# Patient Record
Sex: FEMALE | Race: WHITE | NOT HISPANIC OR LATINO | Employment: FULL TIME | URBAN - METROPOLITAN AREA
[De-identification: names, ages, dates, MRNs, and addresses within clinical notes are randomized per-mention and may not be internally consistent; named-entity substitution may affect disease eponyms.]

---

## 2024-03-12 ENCOUNTER — APPOINTMENT (OUTPATIENT)
Dept: NUCLEAR MEDICINE | Facility: HOSPITAL | Age: 64
End: 2024-03-12
Payer: COMMERCIAL

## 2024-03-12 ENCOUNTER — HOSPITAL ENCOUNTER (OUTPATIENT)
Facility: HOSPITAL | Age: 64
Discharge: HOME | End: 2024-03-12
Attending: EMERGENCY MEDICINE | Admitting: EMERGENCY MEDICINE
Payer: COMMERCIAL

## 2024-03-12 ENCOUNTER — APPOINTMENT (OUTPATIENT)
Dept: CT IMAGING | Facility: HOSPITAL | Age: 64
End: 2024-03-12
Payer: COMMERCIAL

## 2024-03-12 ENCOUNTER — APPOINTMENT (OUTPATIENT)
Dept: GENERAL RADIOLOGY | Facility: HOSPITAL | Age: 64
End: 2024-03-12
Payer: COMMERCIAL

## 2024-03-12 ENCOUNTER — APPOINTMENT (OUTPATIENT)
Dept: CARDIOLOGY | Facility: HOSPITAL | Age: 64
End: 2024-03-12
Payer: COMMERCIAL

## 2024-03-12 VITALS
WEIGHT: 231.48 LBS | HEIGHT: 65 IN | DIASTOLIC BLOOD PRESSURE: 65 MMHG | HEART RATE: 60 BPM | SYSTOLIC BLOOD PRESSURE: 123 MMHG | OXYGEN SATURATION: 94 % | BODY MASS INDEX: 38.57 KG/M2 | TEMPERATURE: 97.6 F | RESPIRATION RATE: 16 BRPM

## 2024-03-12 DIAGNOSIS — I10 HYPERTENSION, UNSPECIFIED TYPE: ICD-10-CM

## 2024-03-12 DIAGNOSIS — R42 DIZZY: ICD-10-CM

## 2024-03-12 DIAGNOSIS — R07.9 CHEST PAIN, UNSPECIFIED TYPE: Primary | ICD-10-CM

## 2024-03-12 LAB
ALBUMIN SERPL-MCNC: 4.2 G/DL (ref 3.5–5.2)
ALBUMIN/GLOB SERPL: 2.2 G/DL
ALP SERPL-CCNC: 55 U/L (ref 39–117)
ALT SERPL W P-5'-P-CCNC: 21 U/L (ref 1–33)
ANION GAP SERPL CALCULATED.3IONS-SCNC: 11 MMOL/L (ref 5–15)
APTT PPP: 25.8 SECONDS (ref 61–76.5)
AST SERPL-CCNC: 22 U/L (ref 1–32)
BASOPHILS # BLD AUTO: 0.1 10*3/MM3 (ref 0–0.2)
BASOPHILS NFR BLD AUTO: 0.6 % (ref 0–1.5)
BH CV ECHO LEFT VENTRICLE GLOBAL LONGITUDINAL STRAIN: -21.3 %
BH CV ECHO MEAS - ACS: 2 CM
BH CV ECHO MEAS - AO MAX PG: 5.2 MMHG
BH CV ECHO MEAS - AO MEAN PG: 3 MMHG
BH CV ECHO MEAS - AO V2 MAX: 114 CM/SEC
BH CV ECHO MEAS - AO V2 VTI: 25.7 CM
BH CV ECHO MEAS - AVA(I,D): 3.9 CM2
BH CV ECHO MEAS - EDV(CUBED): 85.2 ML
BH CV ECHO MEAS - EDV(MOD-SP4): 82.2 ML
BH CV ECHO MEAS - EF(MOD-SP4): 54.5 %
BH CV ECHO MEAS - ESV(CUBED): 29.8 ML
BH CV ECHO MEAS - ESV(MOD-SP4): 37.4 ML
BH CV ECHO MEAS - FS: 29.5 %
BH CV ECHO MEAS - IVS/LVPW: 1 CM
BH CV ECHO MEAS - IVSD: 1.1 CM
BH CV ECHO MEAS - LA DIMENSION: 3.2 CM
BH CV ECHO MEAS - LAT PEAK E' VEL: 8.6 CM/SEC
BH CV ECHO MEAS - LV DIASTOLIC VOL/BSA (35-75): 38.5 CM2
BH CV ECHO MEAS - LV MASS(C)D: 168.9 GRAMS
BH CV ECHO MEAS - LV MAX PG: 6.6 MMHG
BH CV ECHO MEAS - LV MEAN PG: 3 MMHG
BH CV ECHO MEAS - LV SYSTOLIC VOL/BSA (12-30): 17.5 CM2
BH CV ECHO MEAS - LV V1 MAX: 128 CM/SEC
BH CV ECHO MEAS - LV V1 VTI: 29.1 CM
BH CV ECHO MEAS - LVIDD: 4.4 CM
BH CV ECHO MEAS - LVIDS: 3.1 CM
BH CV ECHO MEAS - LVOT AREA: 3.5 CM2
BH CV ECHO MEAS - LVOT DIAM: 2.1 CM
BH CV ECHO MEAS - LVPWD: 1.1 CM
BH CV ECHO MEAS - MED PEAK E' VEL: 7 CM/SEC
BH CV ECHO MEAS - MV A MAX VEL: 83.8 CM/SEC
BH CV ECHO MEAS - MV DEC SLOPE: 142 CM/SEC2
BH CV ECHO MEAS - MV DEC TIME: 0.33 SEC
BH CV ECHO MEAS - MV E MAX VEL: 52.9 CM/SEC
BH CV ECHO MEAS - MV E/A: 0.63
BH CV ECHO MEAS - MV MAX PG: 3.5 MMHG
BH CV ECHO MEAS - MV MEAN PG: 1 MMHG
BH CV ECHO MEAS - MV P1/2T: 138.2 MSEC
BH CV ECHO MEAS - MV V2 VTI: 28.3 CM
BH CV ECHO MEAS - MVA(P1/2T): 1.59 CM2
BH CV ECHO MEAS - MVA(VTI): 3.6 CM2
BH CV ECHO MEAS - PA ACC TIME: 0.16 SEC
BH CV ECHO MEAS - PA V2 MAX: 92.7 CM/SEC
BH CV ECHO MEAS - RV MAX PG: 1.72 MMHG
BH CV ECHO MEAS - RV V1 MAX: 65.5 CM/SEC
BH CV ECHO MEAS - RV V1 VTI: 17.4 CM
BH CV ECHO MEAS - RVDD: 2.5 CM
BH CV ECHO MEAS - SI(MOD-SP4): 21 ML/M2
BH CV ECHO MEAS - SV(LVOT): 100.8 ML
BH CV ECHO MEAS - SV(MOD-SP4): 44.8 ML
BH CV ECHO MEAS - TAPSE (>1.6): 1.32 CM
BH CV ECHO MEASUREMENTS AVERAGE E/E' RATIO: 6.78
BH CV REST NUCLEAR ISOTOPE DOSE: 11 MCI
BH CV STRESS BP STAGE 1: NORMAL
BH CV STRESS BP STAGE 2: NORMAL
BH CV STRESS COMMENTS STAGE 1: NORMAL
BH CV STRESS COMMENTS STAGE 2: NORMAL
BH CV STRESS DOSE REGADENOSON STAGE 1: 0.4
BH CV STRESS DURATION MIN STAGE 1: 0
BH CV STRESS DURATION MIN STAGE 2: 4
BH CV STRESS DURATION SEC STAGE 1: 10
BH CV STRESS DURATION SEC STAGE 2: 0
BH CV STRESS HR STAGE 1: 58
BH CV STRESS HR STAGE 2: 98
BH CV STRESS NUCLEAR ISOTOPE DOSE: 33 MCI
BH CV STRESS PROTOCOL 1: NORMAL
BH CV STRESS RECOVERY BP: NORMAL MMHG
BH CV STRESS RECOVERY HR: 75 BPM
BH CV STRESS STAGE 1: 1
BH CV STRESS STAGE 2: 2
BH CV XLRA - TDI S': 11.6 CM/SEC
BILIRUB SERPL-MCNC: 0.4 MG/DL (ref 0–1.2)
BUN SERPL-MCNC: 23 MG/DL (ref 8–23)
BUN/CREAT SERPL: 26.1 (ref 7–25)
CALCIUM SPEC-SCNC: 9.1 MG/DL (ref 8.6–10.5)
CHLORIDE SERPL-SCNC: 100 MMOL/L (ref 98–107)
CO2 SERPL-SCNC: 27 MMOL/L (ref 22–29)
CREAT SERPL-MCNC: 0.88 MG/DL (ref 0.57–1)
D DIMER PPP FEU-MCNC: <0.19 MG/L (FEU) (ref 0–0.62)
DEPRECATED RDW RBC AUTO: 46.4 FL (ref 37–54)
EGFRCR SERPLBLD CKD-EPI 2021: 74.4 ML/MIN/1.73
EOSINOPHIL # BLD AUTO: 0.1 10*3/MM3 (ref 0–0.4)
EOSINOPHIL NFR BLD AUTO: 1.4 % (ref 0.3–6.2)
ERYTHROCYTE [DISTWIDTH] IN BLOOD BY AUTOMATED COUNT: 14.8 % (ref 12.3–15.4)
GEN 5 2HR TROPONIN T REFLEX: 9 NG/L
GLOBULIN UR ELPH-MCNC: 1.9 GM/DL
GLUCOSE SERPL-MCNC: 101 MG/DL (ref 65–99)
HCT VFR BLD AUTO: 41.7 % (ref 34–46.6)
HGB BLD-MCNC: 13.9 G/DL (ref 12–15.9)
INR PPP: 0.98 (ref 0.93–1.1)
LEFT ATRIUM VOLUME INDEX: 18.3 ML/M2
LYMPHOCYTES # BLD AUTO: 3.9 10*3/MM3 (ref 0.7–3.1)
LYMPHOCYTES NFR BLD AUTO: 40.4 % (ref 19.6–45.3)
MAXIMAL PREDICTED HEART RATE: 158 BPM
MCH RBC QN AUTO: 29.5 PG (ref 26.6–33)
MCHC RBC AUTO-ENTMCNC: 33.3 G/DL (ref 31.5–35.7)
MCV RBC AUTO: 88.5 FL (ref 79–97)
MONOCYTES # BLD AUTO: 0.8 10*3/MM3 (ref 0.1–0.9)
MONOCYTES NFR BLD AUTO: 8.5 % (ref 5–12)
NEUTROPHILS NFR BLD AUTO: 4.7 10*3/MM3 (ref 1.7–7)
NEUTROPHILS NFR BLD AUTO: 49.1 % (ref 42.7–76)
NRBC BLD AUTO-RTO: 0.1 /100 WBC (ref 0–0.2)
PERCENT MAX PREDICTED HR: 66.46 %
PLATELET # BLD AUTO: 339 10*3/MM3 (ref 140–450)
PMV BLD AUTO: 8.2 FL (ref 6–12)
POTASSIUM SERPL-SCNC: 3.9 MMOL/L (ref 3.5–5.2)
PROT SERPL-MCNC: 6.1 G/DL (ref 6–8.5)
PROTHROMBIN TIME: 10.7 SECONDS (ref 9.6–11.7)
RBC # BLD AUTO: 4.71 10*6/MM3 (ref 3.77–5.28)
SINUS: 3 CM
SODIUM SERPL-SCNC: 138 MMOL/L (ref 136–145)
STJ: 2 CM
STRESS BASELINE BP: NORMAL MMHG
STRESS BASELINE HR: 58 BPM
STRESS PERCENT HR: 78 %
STRESS POST PEAK BP: NORMAL MMHG
STRESS POST PEAK HR: 105 BPM
STRESS TARGET HR: 134 BPM
TROPONIN T DELTA: -1 NG/L
TROPONIN T SERPL HS-MCNC: 10 NG/L
WBC NRBC COR # BLD AUTO: 9.6 10*3/MM3 (ref 3.4–10.8)

## 2024-03-12 PROCEDURE — 84484 ASSAY OF TROPONIN QUANT: CPT | Performed by: EMERGENCY MEDICINE

## 2024-03-12 PROCEDURE — 80053 COMPREHEN METABOLIC PANEL: CPT | Performed by: EMERGENCY MEDICINE

## 2024-03-12 PROCEDURE — 93005 ELECTROCARDIOGRAM TRACING: CPT | Performed by: EMERGENCY MEDICINE

## 2024-03-12 PROCEDURE — 93005 ELECTROCARDIOGRAM TRACING: CPT

## 2024-03-12 PROCEDURE — 93306 TTE W/DOPPLER COMPLETE: CPT

## 2024-03-12 PROCEDURE — A9502 TC99M TETROFOSMIN: HCPCS | Performed by: NURSE PRACTITIONER

## 2024-03-12 PROCEDURE — 25010000002 REGADENOSON 0.4 MG/5ML SOLUTION: Performed by: NURSE PRACTITIONER

## 2024-03-12 PROCEDURE — 85730 THROMBOPLASTIN TIME PARTIAL: CPT | Performed by: EMERGENCY MEDICINE

## 2024-03-12 PROCEDURE — 25010000002 ONDANSETRON PER 1 MG: Performed by: EMERGENCY MEDICINE

## 2024-03-12 PROCEDURE — 71045 X-RAY EXAM CHEST 1 VIEW: CPT

## 2024-03-12 PROCEDURE — G0378 HOSPITAL OBSERVATION PER HR: HCPCS

## 2024-03-12 PROCEDURE — 99285 EMERGENCY DEPT VISIT HI MDM: CPT

## 2024-03-12 PROCEDURE — 0 TECHNETIUM TETROFOSMIN KIT: Performed by: INTERNAL MEDICINE

## 2024-03-12 PROCEDURE — 93018 CV STRESS TEST I&R ONLY: CPT | Performed by: INTERNAL MEDICINE

## 2024-03-12 PROCEDURE — 85025 COMPLETE CBC W/AUTO DIFF WBC: CPT | Performed by: EMERGENCY MEDICINE

## 2024-03-12 PROCEDURE — 93017 CV STRESS TEST TRACING ONLY: CPT

## 2024-03-12 PROCEDURE — 0 TECHNETIUM TETROFOSMIN KIT: Performed by: NURSE PRACTITIONER

## 2024-03-12 PROCEDURE — 78452 HT MUSCLE IMAGE SPECT MULT: CPT

## 2024-03-12 PROCEDURE — 93356 MYOCRD STRAIN IMG SPCKL TRCK: CPT

## 2024-03-12 PROCEDURE — 85610 PROTHROMBIN TIME: CPT | Performed by: EMERGENCY MEDICINE

## 2024-03-12 PROCEDURE — 78452 HT MUSCLE IMAGE SPECT MULT: CPT | Performed by: INTERNAL MEDICINE

## 2024-03-12 PROCEDURE — 99204 OFFICE O/P NEW MOD 45 MIN: CPT | Performed by: INTERNAL MEDICINE

## 2024-03-12 PROCEDURE — 36415 COLL VENOUS BLD VENIPUNCTURE: CPT

## 2024-03-12 PROCEDURE — 93306 TTE W/DOPPLER COMPLETE: CPT | Performed by: INTERNAL MEDICINE

## 2024-03-12 PROCEDURE — A9502 TC99M TETROFOSMIN: HCPCS | Performed by: INTERNAL MEDICINE

## 2024-03-12 PROCEDURE — 70450 CT HEAD/BRAIN W/O DYE: CPT

## 2024-03-12 PROCEDURE — 85379 FIBRIN DEGRADATION QUANT: CPT | Performed by: EMERGENCY MEDICINE

## 2024-03-12 PROCEDURE — 93356 MYOCRD STRAIN IMG SPCKL TRCK: CPT | Performed by: INTERNAL MEDICINE

## 2024-03-12 PROCEDURE — 96374 THER/PROPH/DIAG INJ IV PUSH: CPT

## 2024-03-12 RX ORDER — POLYETHYLENE GLYCOL 3350 17 G/17G
17 POWDER, FOR SOLUTION ORAL DAILY PRN
Status: DISCONTINUED | OUTPATIENT
Start: 2024-03-12 | End: 2024-03-12 | Stop reason: HOSPADM

## 2024-03-12 RX ORDER — BISACODYL 5 MG/1
5 TABLET, DELAYED RELEASE ORAL DAILY PRN
Status: DISCONTINUED | OUTPATIENT
Start: 2024-03-12 | End: 2024-03-12 | Stop reason: HOSPADM

## 2024-03-12 RX ORDER — ACETAMINOPHEN 160 MG/5ML
650 SOLUTION ORAL EVERY 4 HOURS PRN
Status: DISCONTINUED | OUTPATIENT
Start: 2024-03-12 | End: 2024-03-12 | Stop reason: HOSPADM

## 2024-03-12 RX ORDER — ALUMINA, MAGNESIA, AND SIMETHICONE 2400; 2400; 240 MG/30ML; MG/30ML; MG/30ML
15 SUSPENSION ORAL EVERY 6 HOURS PRN
Status: DISCONTINUED | OUTPATIENT
Start: 2024-03-12 | End: 2024-03-12 | Stop reason: HOSPADM

## 2024-03-12 RX ORDER — ACETAMINOPHEN 325 MG/1
650 TABLET ORAL EVERY 4 HOURS PRN
Status: DISCONTINUED | OUTPATIENT
Start: 2024-03-12 | End: 2024-03-12 | Stop reason: HOSPADM

## 2024-03-12 RX ORDER — ACETAMINOPHEN 650 MG/1
650 SUPPOSITORY RECTAL EVERY 4 HOURS PRN
Status: DISCONTINUED | OUTPATIENT
Start: 2024-03-12 | End: 2024-03-12 | Stop reason: HOSPADM

## 2024-03-12 RX ORDER — REGADENOSON 0.08 MG/ML
0.4 INJECTION, SOLUTION INTRAVENOUS
Status: COMPLETED | OUTPATIENT
Start: 2024-03-12 | End: 2024-03-12

## 2024-03-12 RX ORDER — ALBUTEROL SULFATE 90 UG/1
2 AEROSOL, METERED RESPIRATORY (INHALATION) EVERY 4 HOURS PRN
COMMUNITY

## 2024-03-12 RX ORDER — AMOXICILLIN 250 MG
2 CAPSULE ORAL 2 TIMES DAILY PRN
Status: DISCONTINUED | OUTPATIENT
Start: 2024-03-12 | End: 2024-03-12 | Stop reason: HOSPADM

## 2024-03-12 RX ORDER — FENOFIBRATE 145 MG/1
145 TABLET, COATED ORAL DAILY
Status: DISCONTINUED | OUTPATIENT
Start: 2024-03-13 | End: 2024-03-12 | Stop reason: HOSPADM

## 2024-03-12 RX ORDER — SODIUM CHLORIDE 0.9 % (FLUSH) 0.9 %
10 SYRINGE (ML) INJECTION EVERY 12 HOURS SCHEDULED
Status: DISCONTINUED | OUTPATIENT
Start: 2024-03-12 | End: 2024-03-12 | Stop reason: HOSPADM

## 2024-03-12 RX ORDER — ALBUTEROL SULFATE 2.5 MG/3ML
2.5 SOLUTION RESPIRATORY (INHALATION) EVERY 6 HOURS PRN
Status: DISCONTINUED | OUTPATIENT
Start: 2024-03-12 | End: 2024-03-12 | Stop reason: HOSPADM

## 2024-03-12 RX ORDER — SODIUM CHLORIDE 0.9 % (FLUSH) 0.9 %
10 SYRINGE (ML) INJECTION AS NEEDED
Status: DISCONTINUED | OUTPATIENT
Start: 2024-03-12 | End: 2024-03-12 | Stop reason: HOSPADM

## 2024-03-12 RX ORDER — BISACODYL 10 MG
10 SUPPOSITORY, RECTAL RECTAL DAILY PRN
Status: DISCONTINUED | OUTPATIENT
Start: 2024-03-12 | End: 2024-03-12 | Stop reason: HOSPADM

## 2024-03-12 RX ORDER — ONDANSETRON 4 MG/1
4 TABLET, ORALLY DISINTEGRATING ORAL EVERY 6 HOURS PRN
Status: DISCONTINUED | OUTPATIENT
Start: 2024-03-12 | End: 2024-03-12 | Stop reason: HOSPADM

## 2024-03-12 RX ORDER — MELATONIN
1000 DAILY
COMMUNITY

## 2024-03-12 RX ORDER — SODIUM CHLORIDE 9 MG/ML
40 INJECTION, SOLUTION INTRAVENOUS AS NEEDED
Status: DISCONTINUED | OUTPATIENT
Start: 2024-03-12 | End: 2024-03-12 | Stop reason: HOSPADM

## 2024-03-12 RX ORDER — BUDESONIDE AND FORMOTEROL FUMARATE DIHYDRATE 160; 4.5 UG/1; UG/1
2 AEROSOL RESPIRATORY (INHALATION)
Status: DISCONTINUED | OUTPATIENT
Start: 2024-03-12 | End: 2024-03-12 | Stop reason: HOSPADM

## 2024-03-12 RX ORDER — ONDANSETRON 2 MG/ML
4 INJECTION INTRAMUSCULAR; INTRAVENOUS EVERY 6 HOURS PRN
Status: DISCONTINUED | OUTPATIENT
Start: 2024-03-12 | End: 2024-03-12 | Stop reason: HOSPADM

## 2024-03-12 RX ORDER — FENOFIBRATE 145 MG/1
145 TABLET, COATED ORAL DAILY
COMMUNITY

## 2024-03-12 RX ORDER — ASPIRIN 81 MG/1
81 TABLET ORAL DAILY
Status: DISCONTINUED | OUTPATIENT
Start: 2024-03-12 | End: 2024-03-12 | Stop reason: HOSPADM

## 2024-03-12 RX ORDER — NITROGLYCERIN 0.4 MG/1
0.4 TABLET SUBLINGUAL
Status: DISCONTINUED | OUTPATIENT
Start: 2024-03-12 | End: 2024-03-12 | Stop reason: HOSPADM

## 2024-03-12 RX ORDER — ASPIRIN 81 MG/1
81 TABLET ORAL DAILY
Qty: 30 TABLET | Refills: 0 | Status: SHIPPED | OUTPATIENT
Start: 2024-03-12 | End: 2024-04-11

## 2024-03-12 RX ORDER — ONDANSETRON 2 MG/ML
4 INJECTION INTRAMUSCULAR; INTRAVENOUS ONCE
Status: COMPLETED | OUTPATIENT
Start: 2024-03-12 | End: 2024-03-12

## 2024-03-12 RX ORDER — BUDESONIDE AND FORMOTEROL FUMARATE DIHYDRATE 160; 4.5 UG/1; UG/1
2 AEROSOL RESPIRATORY (INHALATION)
COMMUNITY

## 2024-03-12 RX ADMIN — ONDANSETRON 4 MG: 2 INJECTION INTRAMUSCULAR; INTRAVENOUS at 08:25

## 2024-03-12 RX ADMIN — Medication 10 ML: at 12:57

## 2024-03-12 RX ADMIN — TETROFOSMIN 1 DOSE: 1.38 INJECTION, POWDER, LYOPHILIZED, FOR SOLUTION INTRAVENOUS at 11:08

## 2024-03-12 RX ADMIN — ASPIRIN 81 MG: 81 TABLET, COATED ORAL at 12:02

## 2024-03-12 RX ADMIN — TETROFOSMIN 1 DOSE: 1.38 INJECTION, POWDER, LYOPHILIZED, FOR SOLUTION INTRAVENOUS at 09:48

## 2024-03-12 RX ADMIN — REGADENOSON 0.4 MG: 0.08 INJECTION, SOLUTION INTRAVENOUS at 11:09

## 2024-03-12 NOTE — LETTER
March 12, 2024     Patient: Candace Watson   YOB: 1960   Date of Visit: 3/12/2024       To Whom It May Concern:    It is my medical opinion that Candace Watson may return to work on 3/19/2024 .           Sincerely,      SHEELA Wheat

## 2024-03-12 NOTE — ED PROVIDER NOTES
Subjective   History of Present Illness  63-year-old female complains of chest pain, intermittent sharp sensation the past several days with recent diagnosis of bronchitis on 2/16/2024 at [facility].  Patient also complains of dizziness with a spinning sensation with some associated nausea today with some bilateral jaw pain.  Patient also has an associated right sided headache.  Patient states she has headaches chronically and this is not the worst headache of her life and does feel consistent with prior headaches.  Patient denies any known history of CAD but states her father had CAD in his 40s      Review of Systems   Respiratory:  Positive for cough.    Cardiovascular:  Positive for chest pain.   Neurological:  Positive for light-headedness and headaches.       No past medical history on file.    Allergies   Allergen Reactions    Cephalexin Shortness Of Breath    Codeine Other (See Comments)     Sick & dizzy.       No past surgical history on file.    No family history on file.    Social History     Socioeconomic History    Marital status:            Objective   Physical Exam  Constitutional:       Appearance: Normal appearance.   HENT:      Head: Normocephalic and atraumatic.      Right Ear: Tympanic membrane normal.      Left Ear: Tympanic membrane normal.      Mouth/Throat:      Mouth: Mucous membranes are moist.      Pharynx: Oropharynx is clear.   Eyes:      Extraocular Movements: Extraocular movements intact.      Conjunctiva/sclera: Conjunctivae normal.      Pupils: Pupils are equal, round, and reactive to light.      Comments: No nystagmus appreciated   Cardiovascular:      Rate and Rhythm: Normal rate and regular rhythm.      Heart sounds: Normal heart sounds.   Pulmonary:      Effort: Pulmonary effort is normal.      Breath sounds: Normal breath sounds.   Abdominal:      General: Bowel sounds are normal.      Palpations: Abdomen is soft.   Musculoskeletal:         General: No swelling or  tenderness. Normal range of motion.   Skin:     General: Skin is warm and dry.      Capillary Refill: Capillary refill takes less than 2 seconds.   Neurological:      Mental Status: She is alert and oriented to person, place, and time.      Cranial Nerves: No cranial nerve deficit.      Sensory: No sensory deficit.      Motor: No weakness.   Psychiatric:         Mood and Affect: Mood normal.         Behavior: Behavior normal.         Procedures           ED Course                HEART Score: 3                              Medical Decision Making  Blood pressure improved, 117/77, patient feels better in regards to dizziness and headache.  No chest pain currently.  This may simply be symptomatic hypertension though given patient's risk factors, will observe in ED observation, cardiac consultation.    Problems Addressed:  Chest pain, unspecified type: complicated acute illness or injury  Dizzy: complicated acute illness or injury  Hypertension, unspecified type: complicated acute illness or injury    Amount and/or Complexity of Data Reviewed  Labs: ordered.  Radiology: ordered.  ECG/medicine tests: ordered and independent interpretation performed.     Details: EKG interpretation: Normal sinus rhythm, rate 61, no acute ST change    Risk  Decision regarding hospitalization.        Final diagnoses:   Chest pain, unspecified type   Dizzy   Hypertension, unspecified type       ED Disposition  ED Disposition       ED Disposition   Decision to Admit    Condition   --    Comment   --               No follow-up provider specified.       Medication List      No changes were made to your prescriptions during this visit.            Justus Moore MD  03/12/24 0702

## 2024-03-12 NOTE — CONSULTS
Cardiology Consult Note    Patient Identification:  Name: Candace Watson  Age: 63 y.o.  Sex: female  :  1960  MRN: [id number]             Requesting Physician :  Dr. Justus Moore     Reason for Consultation / Chief Complaint : patient co-management  Chest pain     History of Present Illness:      Mrs. Candace Watson has a PMH of     - dyslipidemia   - asthma, bronchitis   - family history of father with pre-mature CAD in his 40s with bypass, and massive MI; mother with CAD   - allergies to cephalexin, codeine     Presented to the ED with sharp mid-sternal to right sided chest pain with radiation into the arms and jaws.   Patient reports some nausea and vomiting since arrival to the ED. Patient denies smoking history, denies any surgical history.   Patient reports she was recently at [facility] with bronchitis with chest pain as well and troponin's there were negative, as well as BNP.     Labs in the ED showed negative HS troponin, CMP and CBC unremarkable.  D-dimer negative.   CXR negative   EKG shows normal sinus rhythm       Proceed with stress myoview and echocardiogram   Continue fenofibrate, check lipid panel   Add aspirin 81mg daily     Further assessment and plan per Dr. Braxton     Electronically signed by Paula Rosario, SHEELA, 24, 10:58 AM EDT.    Cardiology attending addendum :    I have personally performed a face-to-face diagnostic evaluation, physical exam and reviewed data on this patient.  I have reviewed documentation done by me and nurse practitioner  and corrected as needed.  And agree with the different components of documentation.Greater than 50% of the time spent in the care of this patient was provided by attending consultant/me.      Assessment:  :    Chest pain  Shortness of breath  Coughing spells  Family history of premature heart disease  Dyslipidemia  Obesity with BMI over 30      Recommendations / Plan:        Patient states she first had headaches then started having  shortness of breath and coughing spells for the last 2 weeks.  Started having chest pain which is like throbbing all day since yesterday.  Workup here revealed normal HS troponin CBC and CMP and D-dimer and chest x-ray and EKG.  EKG reviewed/interpreted by me from 3/12/2024 reveals normal sinus rhythm with a rate of 61 bpm  Patient underwent Lexiscan Cardiolite which was negative for ischemia, LVEF of 76%.  Would recommend evaluating other etiologies for patient's symptoms.  Advised patient to follow-up as outpatient since she has family history of significant premature CAD.           Diagnosis Plan   1. Chest pain, unspecified type        2. Dizzy        3. Hypertension, unspecified type                   Past Medical History:  History reviewed. No pertinent past medical history.  Past Surgical History:  History reviewed. No pertinent surgical history.   Allergies:  Allergies   Allergen Reactions    Cephalexin Shortness Of Breath    Codeine Other (See Comments)     Sick & dizzy.     Home Meds:  No medications prior to admission.     Current Meds:     Current Facility-Administered Medications:     acetaminophen (TYLENOL) tablet 650 mg, 650 mg, Oral, Q4H PRN **OR** acetaminophen (TYLENOL) 160 MG/5ML oral solution 650 mg, 650 mg, Oral, Q4H PRN **OR** acetaminophen (TYLENOL) suppository 650 mg, 650 mg, Rectal, Q4H PRN, Delmi Farley S, APRN    albuterol (PROVENTIL) nebulizer solution 0.083% 2.5 mg/3mL, 2.5 mg, Nebulization, Q6H PRN, Hector Farleya S, APRN    aluminum-magnesium hydroxide-simethicone (MAALOX MAX) 400-400-40 MG/5ML suspension 15 mL, 15 mL, Oral, Q6H PRN, Hector Farleya S, APRN    aspirin EC tablet 81 mg, 81 mg, Oral, Daily, Paula Rosario APRN, 81 mg at 03/12/24 1202    sennosides-docusate (PERICOLACE) 8.6-50 MG per tablet 2 tablet, 2 tablet, Oral, BID PRN **AND** polyethylene glycol (MIRALAX) packet 17 g, 17 g, Oral, Daily PRN **AND** bisacodyl (DULCOLAX) EC tablet 5 mg, 5 mg, Oral, Daily PRN  **AND** bisacodyl (DULCOLAX) suppository 10 mg, 10 mg, Rectal, Daily PRN, Tripure, Delmi S, APRN    budesonide-formoterol (SYMBICORT) 160-4.5 MCG/ACT inhaler 2 puff, 2 puff, Inhalation, BID - RT, TripureHectora S, APRN    [START ON 3/13/2024] fenofibrate (TRICOR) tablet 145 mg, 145 mg, Oral, Daily, TripureHectora S, APRN    nitroglycerin (NITROSTAT) SL tablet 0.4 mg, 0.4 mg, Sublingual, Q5 Min PRN, TripureHectora S, APRN    ondansetron ODT (ZOFRAN-ODT) disintegrating tablet 4 mg, 4 mg, Oral, Q6H PRN **OR** ondansetron (ZOFRAN) injection 4 mg, 4 mg, Intravenous, Q6H PRN, TripureVicenteDelmi S, APRN    sodium chloride 0.9 % flush 10 mL, 10 mL, Intravenous, Q12H, Tripure, Delmi S, APRN, 10 mL at 03/12/24 1257    sodium chloride 0.9 % flush 10 mL, 10 mL, Intravenous, PRN, Tripure, Delmi S, APRN    sodium chloride 0.9 % infusion 40 mL, 40 mL, Intravenous, PRN, Tripure, Delmi S, APRN    Current Outpatient Medications:     albuterol sulfate  (90 Base) MCG/ACT inhaler, Inhale 2 puffs Every 4 (Four) Hours As Needed for Wheezing., Disp: , Rfl:     budesonide-formoterol (SYMBICORT) 160-4.5 MCG/ACT inhaler, Inhale 2 puffs 2 (Two) Times a Day., Disp: , Rfl:     aspirin 81 MG EC tablet, Take 1 tablet by mouth Daily for 30 days., Disp: 30 tablet, Rfl: 0    Cholecalciferol 25 MCG (1000 UT) tablet, Take 1 tablet by mouth Daily., Disp: , Rfl:     fenofibrate (TRICOR) 145 MG tablet, Take 1 tablet by mouth Daily., Disp: , Rfl:   Social History:   Social History     Tobacco Use    Smoking status: Never    Smokeless tobacco: Never   Substance Use Topics    Alcohol use: Yes      Family History:  History reviewed. No pertinent family history.     Review of Systems : Review of Systems   Constitutional: Positive for malaise/fatigue.   Cardiovascular:  Positive for chest pain. Negative for dyspnea on exertion, irregular heartbeat, palpitations and syncope.   Respiratory:  Positive for cough (since August 2023  "intermittently).    Gastrointestinal:  Positive for nausea and vomiting.   All other systems reviewed and are negative.       Constitutional:  Temp:  [97.5 °F (36.4 °C)-97.6 °F (36.4 °C)] 97.6 °F (36.4 °C)  Heart Rate:  [57-69] 60  Resp:  [16-20] 16  BP: ()/() 123/65    Physical Exam   /65 (BP Location: Right arm, Patient Position: Lying)   Pulse 60   Temp 97.6 °F (36.4 °C) (Oral)   Resp 16   Ht 165.1 cm (65\")   Wt 105 kg (231 lb 7.7 oz)   SpO2 94%   BMI 38.52 kg/m²   Physical Exam  General:  Appears in no acute distress  Eyes: Sclerae are anicteric,  conjunctivae are clear   HEENT:  No JVD. Thyroid not visibly enlarged. No mucosal pallor or cyanosis;  birth riaz under left eye/cheek   Respiratory: Respirations regular and unlabored at rest.  Bilaterally good breath sounds with good air entry in all fields. No crackles, rubs or wheezes auscultated  Cardiovascular: S1,S2 Regular rate and rhythm. No murmur, rub or gallop auscultated. No pretibial pitting edema  Gastrointestinal: Abdomen soft, flat, nontender. Bowel sounds present.   Musculoskeletal:  No abnormal movements  Extremities: No digital clubbing or cyanosis  Skin: Color pink. Skin warm and dry to touch. No rashes  No xanthoma  Neuro: Alert and awake, no lateralizing deficits appreciated    Cardiographics  ECG: EKG tracing was  personally reviewed/interpreted by me  ECG 12 Lead Chest Pain   Preliminary Result   HEART RATE= 61  bpm   RR Interval= 984  ms   WA Interval= 185  ms   P Horizontal Axis= 0  deg   P Front Axis= 43  deg   QRSD Interval= 81  ms   QT Interval= 384  ms   QTcB= 387  ms   QRS Axis= 19  deg   T Wave Axis= 35  deg   - NORMAL ECG -   Sinus rhythm   No previous ECG available for comparison   Electronically Signed By:    Date and Time of Study: 2024-03-12 03:36:45          Telemetry: sinus rhythm     Echocardiogram:       Imaging  Chest X-ray:   Imaging Results (Last 24 Hours)       Procedure Component Value Units " Date/Time    CT Head Without Contrast [[id number]] Collected: 03/12/24 0542     Updated: 03/12/24 0544    Narrative:      CT HEAD WO CONTRAST    Date of Exam: 3/12/2024 5:40 AM EDT    Indication: right ha, dizziness.    Comparison: None available.    Technique: Axial CT images were obtained of the head without contrast administration.  Coronal reconstructions were performed.  Automated exposure control and iterative reconstruction methods were used.      Findings:  There is no evidence of hemorrhage. There is no mass effect or midline shift.    There is no extracerebral collection.    Ventricles are normal in size and configuration for patient's stated age.      Posterior fossa is within normal limits.    Calvarium and skull base appear intact.   Visualized sinuses show no air fluid levels. Visualized orbits are unremarkable.      Impression:      Impression:  No acute intracranial process identified.        Electronically Signed: Emma Esparza MD    3/12/2024 5:42 AM EDT    Workstation ID: [id number]    XR Chest 1 View [[id number]] Collected: 03/12/24 0447     Updated: 03/12/24 0450    Narrative:      XR CHEST 1 VW    Date of Exam: 3/12/2024 4:26 AM EDT    Indication: cp    Comparison: 2/25/2023.    Findings:  There are no airspace consolidations. No pleural fluid. No pneumothorax. The pulmonary vasculature appears within normal limits. The cardiac and mediastinal silhouette appear unremarkable. No acute osseous abnormality identified.      Impression:      Impression:  No acute cardiopulmonary process.        Electronically Signed: Emma Esparza MD    3/12/2024 4:48 AM EDT    Workstation ID: [id number]            Lab Review: I have reviewed the labs  Results from last 7 days   Lab Units 03/12/24  0707 03/12/24  0515   HSTROP T ng/L 9 10         Results from last 7 days   Lab Units 03/12/24  0515   SODIUM mmol/L 138   POTASSIUM mmol/L 3.9   BUN mg/dL 23   CREATININE mg/dL 0.88   CALCIUM mg/dL 9.1             Results from  last 7 days   Lab Units 03/12/24  0411   WBC 10*3/mm3 9.60   HEMOGLOBIN g/dL 13.9   HEMATOCRIT % 41.7   PLATELETS 10*3/mm3 339     Results from last 7 days   Lab Units 03/12/24 0411   INR  0.98   APTT seconds 25.8*             Shane Braxton MD  3/12/2024, 17:53 EDT      EMR Dragon/Transcription:   Dictated utilizing Dragon dictation

## 2024-03-12 NOTE — DISCHARGE SUMMARY
[facility]    Abril Ibarra APRN    CHIEF COMPLAINT:     Chest Pain     HISTORY OF PRESENT ILLNESS:    Dizziness      ED 03/12/2024  63-year-old female complains of chest pain, intermittent sharp sensation the past several days with recent diagnosis of bronchitis on 2/16/2024 at [facility].  Patient also complains of dizziness with a spinning sensation with some associated nausea today with some bilateral jaw pain.  Patient also has an associated right sided headache.  Patient states she has headaches chronically and this is not the worst headache of her life and does feel consistent with prior headaches.  Patient denies any known history of CAD but states her father had CAD in his 40s    Observation 03/12/2024  Patient agrees with HPI noted above including sharp chest pain that felt like muscle cramps with onset today.  Reports that today was the second incident of chest pain that she has had in the past month.  Discussed negative stress test and normal 2D echo results.  Currently asymptomatic and eager for discharge.  States that her employer has told her to be off work for a week.  Requesting a work note.       History reviewed. No pertinent past medical history.  History reviewed. No pertinent surgical history.  History reviewed. No pertinent family history.  Social History     Tobacco Use    Smoking status: Never    Smokeless tobacco: Never   Vaping Use    Vaping status: Never Used   Substance Use Topics    Alcohol use: Yes    Drug use: Never     Medications Prior to Admission   Medication Sig Dispense Refill Last Dose    albuterol sulfate  (90 Base) MCG/ACT inhaler Inhale 2 puffs Every 4 (Four) Hours As Needed for Wheezing.       budesonide-formoterol (SYMBICORT) 160-4.5 MCG/ACT inhaler Inhale 2 puffs 2 (Two) Times a Day.       Cholecalciferol 25 MCG (1000 UT) tablet Take 1 tablet by mouth Daily.       fenofibrate (TRICOR) 145 MG tablet Take 1 tablet by mouth Daily.         Allergies:  Cephalexin and Codeine    Immunization History   Administered Date(s) Administered    COVID-19 (PFIZER) Purple Cap Monovalent 03/12/2021, 04/02/2021    Covid-19 (Pfizer) Gray Cap Monovalent 02/04/2022           REVIEW OF SYSTEMS:    Review of Systems   Neurological:  Positive for dizziness.       Review of Systems   Respiratory:  Positive for cough.    Cardiovascular:  Positive for chest pain.   Neurological:  Positive for light-headedness and headaches.        Vital Signs  Temp:  [97.5 °F (36.4 °C)-97.6 °F (36.4 °C)] 97.6 °F (36.4 °C)  Heart Rate:  [57-69] 60  Resp:  [16-20] 16  BP: ()/() 123/65          Physical Exam:  Physical Exam  Vitals and nursing note reviewed.   Constitutional:       Appearance: Normal appearance. She is obese.   HENT:      Head: Normocephalic and atraumatic.      Right Ear: External ear normal.      Left Ear: External ear normal.      Nose: Nose normal.      Mouth/Throat:      Mouth: Mucous membranes are moist.      Pharynx: Oropharynx is clear.   Eyes:      Extraocular Movements: Extraocular movements intact.   Cardiovascular:      Rate and Rhythm: Normal rate and regular rhythm.      Pulses: Normal pulses.      Heart sounds: Normal heart sounds.   Pulmonary:      Effort: Pulmonary effort is normal.      Breath sounds: Normal breath sounds.   Abdominal:      General: Abdomen is flat. Bowel sounds are normal.      Palpations: Abdomen is soft.   Musculoskeletal:         General: Normal range of motion.      Cervical back: Normal range of motion.   Skin:     General: Skin is warm.      Comments: Birth riaz noted on left side of face   Neurological:      General: No focal deficit present.      Mental Status: She is alert and oriented to person, place, and time.   Psychiatric:         Mood and Affect: Mood normal.         Behavior: Behavior normal.         Emotional Behavior:    Normal    Debilities:   None  Results Review:    I reviewed the patient's new clinical  results.  Lab Results (most recent)       Procedure Component Value Units Date/Time    High Sensitivity Troponin T 2Hr [[id number]]  (Normal) Collected: 03/12/24 0707    Specimen: Blood Updated: 03/12/24 0738     HS Troponin T 9 ng/L      Troponin T Delta -1 ng/L     Narrative:      High Sensitive Troponin T Reference Range:  <14.0 ng/L- Negative Female for AMI  <22.0 ng/L- Negative Male for AMI  >=14 - Abnormal Female indicating possible myocardial injury.  >=22 - Abnormal Male indicating possible myocardial injury.   Clinicians would have to utilize clinical acumen, EKG, Troponin, and serial changes to determine if it is an Acute Myocardial Infarction or myocardial injury due to an underlying chronic condition.         Comprehensive Metabolic Panel [[id number]]  (Abnormal) Collected: 03/12/24 0515    Specimen: Blood Updated: 03/12/24 0552     Glucose 101 mg/dL      BUN 23 mg/dL      Creatinine 0.88 mg/dL      Sodium 138 mmol/L      Potassium 3.9 mmol/L      Comment: Slight hemolysis detected by analyzer. Result may be falsely elevated.        Chloride 100 mmol/L      CO2 27.0 mmol/L      Calcium 9.1 mg/dL      Total Protein 6.1 g/dL      Albumin 4.2 g/dL      ALT (SGPT) 21 U/L      AST (SGOT) 22 U/L      Comment: Slight hemolysis detected by analyzer. Result may be falsely elevated.        Alkaline Phosphatase 55 U/L      Total Bilirubin 0.4 mg/dL      Globulin 1.9 gm/dL      A/G Ratio 2.2 g/dL      BUN/Creatinine Ratio 26.1     Anion Gap 11.0 mmol/L      eGFR 74.4 mL/min/1.73     Narrative:      GFR Normal >60  Chronic Kidney Disease <60  Kidney Failure <15      High Sensitivity Troponin T [[id number]]  (Normal) Collected: 03/12/24 0515    Specimen: Blood Updated: 03/12/24 0544     HS Troponin T 10 ng/L     Narrative:      High Sensitive Troponin T Reference Range:  <14.0 ng/L- Negative Female for AMI  <22.0 ng/L- Negative Male for AMI  >=14 - Abnormal Female indicating possible myocardial injury.  >=22 - Abnormal  "Male indicating possible myocardial injury.   Clinicians would have to utilize clinical acumen, EKG, Troponin, and serial changes to determine if it is an Acute Myocardial Infarction or myocardial injury due to an underlying chronic condition.         Protime-INR [[id number]]  (Normal) Collected: 03/12/24 0411    Specimen: Blood Updated: 03/12/24 0445     Protime 10.7 Seconds      INR 0.98    aPTT [[id number]]  (Abnormal) Collected: 03/12/24 0411    Specimen: Blood Updated: 03/12/24 0445     PTT 25.8 seconds     D-dimer, Quantitative [[id number]]  (Normal) Collected: 03/12/24 0411    Specimen: Blood Updated: 03/12/24 0445     D-Dimer, Quantitative <0.19 mg/L (FEU)     Narrative:      According to the assay 's published package insert, a normal (<0.50 mg/L (FEU)) D-dimer result in conjunction with a non-high clinical probability assessment, excludes deep vein thrombosis (DVT) and pulmonary embolism (PE) with high sensitivity.    D-dimer values increase with age and this can make VTE exclusion of an older population difficult. To address this, the American College of [employer], based on best available evidence and recent guidelines, recommends that clinicians use age-adjusted D-dimer thresholds in patients greater than 50 years of age with: a) a low probability of PE who do not meet all Pulmonary Embolism Rule Out Criteria, or b) in those with intermediate probability of PE.   The formula for an age-adjusted D-dimer cut-off is \"age/100\".  For example, a 60 year old patient would have an age-adjusted cut-off of 0.60 mg/L (FEU) and an 80 year old 0.80 mg/L (FEU).    CBC & Differential [[id number]]  (Abnormal) Collected: 03/12/24 0411    Specimen: Blood Updated: 03/12/24 0433    Narrative:      The following orders were created for panel order CBC & Differential.  Procedure                               Abnormality         Status                     ---------                               -----------         " ------                     CBC Auto Differential[[id number]]        Abnormal            Final result                 Please view results for these tests on the individual orders.    CBC Auto Differential [[id number]]  (Abnormal) Collected: 03/12/24 0411    Specimen: Blood Updated: 03/12/24 0433     WBC 9.60 10*3/mm3      RBC 4.71 10*6/mm3      Hemoglobin 13.9 g/dL      Hematocrit 41.7 %      MCV 88.5 fL      MCH 29.5 pg      MCHC 33.3 g/dL      RDW 14.8 %      RDW-SD 46.4 fl      MPV 8.2 fL      Platelets 339 10*3/mm3      Neutrophil % 49.1 %      Lymphocyte % 40.4 %      Monocyte % 8.5 %      Eosinophil % 1.4 %      Basophil % 0.6 %      Neutrophils, Absolute 4.70 10*3/mm3      Lymphocytes, Absolute 3.90 10*3/mm3      Monocytes, Absolute 0.80 10*3/mm3      Eosinophils, Absolute 0.10 10*3/mm3      Basophils, Absolute 0.10 10*3/mm3      nRBC 0.1 /100 WBC             Imaging Results (Most Recent)       Procedure Component Value Units Date/Time    CT Head Without Contrast [[id number]] Collected: 03/12/24 0542     Updated: 03/12/24 0544    Narrative:      CT HEAD WO CONTRAST    Date of Exam: 3/12/2024 5:40 AM EDT    Indication: right ha, dizziness.    Comparison: None available.    Technique: Axial CT images were obtained of the head without contrast administration.  Coronal reconstructions were performed.  Automated exposure control and iterative reconstruction methods were used.      Findings:  There is no evidence of hemorrhage. There is no mass effect or midline shift.    There is no extracerebral collection.    Ventricles are normal in size and configuration for patient's stated age.      Posterior fossa is within normal limits.    Calvarium and skull base appear intact.   Visualized sinuses show no air fluid levels. Visualized orbits are unremarkable.      Impression:      Impression:  No acute intracranial process identified.        Electronically Signed: Emma Esparza MD    3/12/2024 5:42 AM EDT    Workstation ID:  [id number]    XR Chest 1 View [[id number]] Collected: 03/12/24 0447     Updated: 03/12/24 0450    Narrative:      XR CHEST 1 VW    Date of Exam: 3/12/2024 4:26 AM EDT    Indication: cp    Comparison: 2/25/2023.    Findings:  There are no airspace consolidations. No pleural fluid. No pneumothorax. The pulmonary vasculature appears within normal limits. The cardiac and mediastinal silhouette appear unremarkable. No acute osseous abnormality identified.      Impression:      Impression:  No acute cardiopulmonary process.        Electronically Signed: Emma Esparza MD    3/12/2024 4:48 AM EDT    Workstation ID: [id number]          reviewed    ECG/EMG Results (most recent)       Procedure Component Value Units Date/Time    ECG 12 Lead Chest Pain [[id number]] Collected: 03/12/24 0336     Updated: 03/12/24 0337     QT Interval 384 ms      QTC Interval 387 ms     Narrative:      HEART RATE= 61  bpm  RR Interval= 984  ms  PA Interval= 185  ms  P Horizontal Axis= 0  deg  P Front Axis= 43  deg  QRSD Interval= 81  ms  QT Interval= 384  ms  QTcB= 387  ms  QRS Axis= 19  deg  T Wave Axis= 35  deg  - NORMAL ECG -  Sinus rhythm  No previous ECG available for comparison  Electronically Signed By:   Date and Time of Study: 2024-03-12 03:36:45    Adult Transthoracic Echo Complete W/ Cont if Necessary Per Protocol [[id number]] Resulted: 03/12/24 1325     Updated: 03/12/24 1325     LV GLOBAL STRAIN  -21.3 %      LVIDd 4.4 cm      LVIDs 3.1 cm      IVSd 1.10 cm      LVPWd 1.10 cm      FS 29.5 %      IVS/LVPW 1.00 cm      ESV(cubed) 29.8 ml      LV Sys Vol (BSA corrected) 17.5 cm2      EDV(cubed) 85.2 ml      LV [place] Vol (BSA corrected) 38.5 cm2      LV mass(C)d 168.9 grams      LVOT area 3.5 cm2      LVOT diam 2.10 cm      EDV(MOD-sp4) 82.2 ml      ESV(MOD-sp4) 37.4 ml      SV(MOD-sp4) 44.8 ml      SI(MOD-sp4) 21.0 ml/m2      EF(MOD-sp4) 54.5 %      MV E max kayla 52.9 cm/sec      MV A max kayla 83.8 cm/sec      MV dec time 0.33 sec      MV E/A 0.63      LA ESV Index (BP) 18.3 ml/m2      Med Peak E' Slim 7.0 cm/sec      Lat Peak E' Slim 8.6 cm/sec      Avg E/e' ratio 6.78     SV(LVOT) 100.8 ml      RVIDd 2.5 cm      TAPSE (>1.6) 1.32 cm      RV S' 11.6 cm/sec      LA dimension (2D)  3.2 cm      LV V1 max 128.0 cm/sec      LV V1 max PG 6.6 mmHg      LV V1 mean PG 3.0 mmHg      LV V1 VTI 29.1 cm      Ao pk slim 114.0 cm/sec      Ao max PG 5.2 mmHg      Ao mean PG 3.0 mmHg      Ao V2 VTI 25.7 cm      SHITAL(I,D) 3.9 cm2      MV max PG 3.5 mmHg      MV mean PG 1.00 mmHg      MV V2 VTI 28.3 cm      MV P1/2t 138.2 msec      MVA(P1/2t) 1.59 cm2      MVA(VTI) 3.6 cm2      MV dec slope 142.0 cm/sec2      RV V1 max PG 1.72 mmHg      RV V1 max 65.5 cm/sec      RV V1 VTI 17.4 cm      PA V2 max 92.7 cm/sec      PA acc time 0.16 sec      ACS 2.00 cm      Sinus 3.0 cm      STJ 2.00 cm           reviewed            Microbiology Results (last 10 days)       ** No results found for the last 240 hours. **            Assessment & Plan     Chest pain        Chest pain  Lab Results   Component Value Date    TROPONINT 9 03/12/2024    TROPONINT 10 03/12/2024   -Troponin unremarkable  -CMP and CBC unremarkable  -D-dimer negative  -PT/INR within normal range  -Lipid panel pending results  -Chest X-ray: Showed no acute cardiopulmonary process  -EKG: Sinus rhythm with heart rate of 61, ME interval 185 and   -In the ED pt given Zofran  -Stress Test showed normal myocardial perfusion no signs of ischemia  -2D echo pending results  -Telemetry  -Cardiology was consulted in the ED and cleared patient for discharge  -Follow up with cardiologist in 2 weeks  -Aspirin 81 mg daily per cardiologist recommendations    Increased triglycerides  -Continue fenofibrate    Obesity  -BMI 38.52  -Lifestyle modifications    I discussed the patients findings and my recommendations with patient, family and nursing staff.     Discharge Diagnosis:      Chest pain      Hospital Course  Patient is a 63 y.o. female  presented with chest pain as noted in HPI above.  Labs unremarkable including troponin, CMP, CBC, D-dimer.  Chest x-ray showed no acute process and EKG showed sinus rhythm.  Stress test showed normal myocardial perfusion no signs of ischemia.  Cardiology was consulted in the ED and cleared patient for discharge.  2D echo pending results. At this time, patient felt to be in good condition for discharge with close follow up with PCP. Instructed to take all medications as prescribed and to return to ED if any concerning signs/symptoms. All test/lab results were discussed with patient. All questions were answered and patient verbalizes understanding.       Past Medical History:   History reviewed. No pertinent past medical history.    Past Surgical History:   History reviewed. No pertinent surgical history.    Social History:   Social History     Socioeconomic History    Marital status:    Tobacco Use    Smoking status: Never    Smokeless tobacco: Never   Vaping Use    Vaping status: Never Used   Substance and Sexual Activity    Alcohol use: Yes    Drug use: Never    Sexual activity: Defer       Procedures Performed         Consults:   Consults       Date and Time Order Name Status Description    3/12/2024  6:58 AM Inpatient Cardiology Consult Completed             Condition on Discharge:     Stable    Discharge Disposition  Home or Self Care    Discharge Medications     Discharge Medications        New Medications        Instructions Start Date   aspirin 81 MG EC tablet   81 mg, Oral, Daily             Continue These Medications        Instructions Start Date   albuterol sulfate  (90 Base) MCG/ACT inhaler  Commonly known as: PROVENTIL HFA;VENTOLIN HFA;PROAIR HFA   2 puffs, Inhalation, Every 4 Hours PRN      budesonide-formoterol 160-4.5 MCG/ACT inhaler  Commonly known as: SYMBICORT   2 puffs, Inhalation, 2 Times Daily - RT      cholecalciferol 25 MCG (1000 UT) tablet   1,000 Units, Oral, Daily       fenofibrate 145 MG tablet  Commonly known as: TRICOR   145 mg, Oral, Daily               Discharge Diet:   Diet Instructions       Diet: Cardiac Diets; Healthy Heart (2-3 Na+); Regular (IDDSI 7); Thin (IDDSI 0)      Discharge Diet: Cardiac Diets    Cardiac Diet: Healthy Heart (2-3 Na+)    Texture: Regular (IDDSI 7)    Fluid Consistency: Thin (IDDSI 0)            Activity at Discharge:   Activity Instructions       Work Restrictions      Type of Restriction: Work    May Return to Work: Specific Date    Return To Work Date: 3/19/2024    With / Without Restrictions: Without Restrictions            Follow-up Appointments  No future appointments.  Additional Instructions for the Follow-ups that You Need to Schedule       Discharge Follow-up with PCP   As directed       Currently Documented PCP:    Abril Ibarra APRN    PCP Phone Number:    None     Follow Up Details: 5-7 days        Discharge Follow-up with Specified Provider: Cardiology; 2 Weeks   As directed      To: Cardiology   Follow Up: 2 Weeks                Test Results Pending at Discharge       Risk for Readmission (LACE) Score: 1 (3/12/2024 12:06 PM)      Greater than 30 minutes spent in discharge activities for this patient    Signature:Electronically signed by SHEELA Wheat, 03/12/24, 2:30 PM EDT.

## 2024-03-13 LAB
QT INTERVAL: 384 MS
QTC INTERVAL: 387 MS

## 2024-03-25 ENCOUNTER — OFFICE VISIT (OUTPATIENT)
Dept: CARDIOLOGY | Facility: CLINIC | Age: 64
End: 2024-03-25
Payer: COMMERCIAL

## 2024-03-25 VITALS
DIASTOLIC BLOOD PRESSURE: 78 MMHG | HEART RATE: 85 BPM | HEIGHT: 65 IN | BODY MASS INDEX: 38.49 KG/M2 | OXYGEN SATURATION: 95 % | WEIGHT: 231 LBS | SYSTOLIC BLOOD PRESSURE: 135 MMHG

## 2024-03-25 DIAGNOSIS — I10 ESSENTIAL HYPERTENSION: Primary | ICD-10-CM

## 2024-03-25 DIAGNOSIS — Z82.49 FAMILY HISTORY OF PREMATURE CAD: ICD-10-CM

## 2024-03-25 DIAGNOSIS — E66.9 OBESITY (BMI 30-39.9): ICD-10-CM

## 2024-03-25 DIAGNOSIS — E78.5 DYSLIPIDEMIA: ICD-10-CM

## 2024-03-25 PROCEDURE — 99214 OFFICE O/P EST MOD 30 MIN: CPT | Performed by: INTERNAL MEDICINE

## 2024-03-25 NOTE — PROGRESS NOTES
Subjective:     Encounter Date:03/25/2024      Patient ID: Candace Watson is a 63 y.o. female.    Chief Complaint and history of present illness:    Hospital follow-up for chest pain, dyslipidemia, positive family history of premature CAD     History of present illness:    Mrs. Candace Watson has a PMH of      - dyslipidemia   - asthma, bronchitis   - family history of father with pre-mature CAD in his 40s with bypass, and massive MI; mother with CAD   - allergies to cephalexin, codeine    Here for hospital follow-up.  Patient was recently in the hospital 3/12/2024 with sharp intermittent substernal chest pain for several days was diagnosed with bronchitis 2/16/2024 started having dizziness like room spinning and nausea bilateral jaw pain right-sided headache.  Before that patient was in [facility] and had normal normal troponins and proBNP.  Workup here in [place] revealed normal HS troponin at 9 and 10.  Normal CMP and D-dimer at less than 0.19.  Patient underwent Lexiscan Cardiolite 3/12/2024 which revealed normal perfusion EF of 76%.  Echocardiogram 3/12/2024 revealed EF of 60 to 65%.  CT head revealed no acute intra or cranial process.  Chest x-ray 3/12/2024 revealed no acute cardiopulmonary process     Is here for hospital follow-up.  Patient denies any chest pain.  Patient states she is salt sensitive when she eats more salt that day her blood pressure is elevated.  PMD gave her as needed hydrochlorothiazide.    Patient's arterial blood pressure is 149/82, repeat was 134/78, heart rate 85, O2 sat of 95% on room air.  BMI is over 30.          Assessment:  :     Essential hypertension/labile blood pressure  Family history of premature heart disease  Dyslipidemia  Obesity with BMI over 30        Recommendations / Plan:         Reviewed patient's records and summarized in HPI and reviewed results with patient.  Workup here revealed normal HS troponin CBC and CMP and D-dimer and chest x-ray and EKG.  EKG  reviewed/interpreted by me from 3/12/2024 reveals normal sinus rhythm with a rate of 61 bpm  Patient underwent Lexiscan Cardiolite which was negative for ischemia, LVEF of 76%.  Advised low-salt diet and diet and exercise.  Will follow-up as needed.         Procedures    Copied text in this portion of the note has been reviewed and is accurate as of 3/25/2024  The following portions of the patient's history were reviewed and updated as appropriate: allergies, current medications, past family history, past medical history, past social history, past surgical history and problem list.    Assessment:         [facility]       Diagnosis Plan   1. Essential hypertension        2. Dyslipidemia        3. Obesity (BMI 30-39.9)        4. Family history of premature CAD               Plan:               Past Medical History:  Past Medical History:   Diagnosis Date    Asthmatic bronchitis     Hyperlipidemia     Hypertension     Seasonal allergies      Past Surgical History:  Past Surgical History:   Procedure Laterality Date     SECTION      ORIF FOREARM FRACTURE        Allergies:  Allergies   Allergen Reactions    Cephalexin Shortness Of Breath    Codeine Other (See Comments)     Sick & dizzy.     Home Meds:  Current Meds:     Current Outpatient Medications:     albuterol sulfate  (90 Base) MCG/ACT inhaler, Inhale 2 puffs Every 4 (Four) Hours As Needed for Wheezing., Disp: , Rfl:     aspirin 81 MG EC tablet, Take 1 tablet by mouth Daily for 30 days., Disp: 30 tablet, Rfl: 0    budesonide-formoterol (SYMBICORT) 160-4.5 MCG/ACT inhaler, Inhale 2 puffs 2 (Two) Times a Day., Disp: , Rfl:     Cholecalciferol 25 MCG (1000 UT) tablet, Take 1 tablet by mouth Daily., Disp: , Rfl:     fenofibrate (TRICOR) 145 MG tablet, Take 1 tablet by mouth Daily., Disp: , Rfl:   Social History:   Social History     Tobacco Use    Smoking status: Never     Passive exposure: Never    Smokeless tobacco: Never   Substance Use Topics    Alcohol  "use: Not Currently     Comment: 1-2      Family History:  Family History   Problem Relation Age of Onset    Stroke Mother     Diabetes Mother     Heart failure Father     Heart disease Father               Review of Systems   Constitutional: Negative for malaise/fatigue.   Cardiovascular:  Negative for chest pain, leg swelling and palpitations.   Respiratory:  Positive for shortness of breath.    Skin:  Negative for rash.   Neurological:  Positive for dizziness and light-headedness. Negative for numbness.     All other systems are negative         Objective:     Physical Exam  /78   Pulse 85   Ht 165.1 cm (65\")   Wt 105 kg (231 lb)   SpO2 95%   BMI 38.44 kg/m²   General:  Appears in no acute distress  Eyes: Sclera is anicteric,  conjunctiva is clear   HEENT:  No JVD.  No carotid bruits  Respiratory: Respirations regular and unlabored at rest.  Clear to auscultation  Cardiovascular: S1,S2 Regular rate and rhythm. No murmur, rub or gallop auscultated.   Extremities: No digital clubbing or cyanosis, no edema  Skin: Color pink. Skin warm and dry to touch. No rashes  No xanthoma  Neuro: Alert and awake.    Lab Reviewed:         Shane Braxton MD  3/25/2024 14:21 EDT      EMR Dragon/Transcription:   \"Dictated utilizing Dragon dictation\".        "